# Patient Record
Sex: MALE | Race: WHITE | Employment: FULL TIME | URBAN - METROPOLITAN AREA
[De-identification: names, ages, dates, MRNs, and addresses within clinical notes are randomized per-mention and may not be internally consistent; named-entity substitution may affect disease eponyms.]

---

## 2022-03-14 ENCOUNTER — TELEPHONE (OUTPATIENT)
Dept: SURGERY | Facility: CLINIC | Age: 47
End: 2022-03-14

## 2022-03-14 ENCOUNTER — HOSPITAL ENCOUNTER (OUTPATIENT)
Facility: HOSPITAL | Age: 47
Setting detail: HOSPITAL OUTPATIENT SURGERY
Discharge: HOME | End: 2022-03-14
Attending: COLON & RECTAL SURGERY | Admitting: COLON & RECTAL SURGERY
Payer: COMMERCIAL

## 2022-03-14 ENCOUNTER — ANESTHESIA EVENT (OUTPATIENT)
Dept: SURGERY | Facility: HOSPITAL | Age: 47
End: 2022-03-14
Payer: COMMERCIAL

## 2022-03-14 ENCOUNTER — ANESTHESIA (OUTPATIENT)
Dept: SURGERY | Facility: HOSPITAL | Age: 47
End: 2022-03-14
Payer: COMMERCIAL

## 2022-03-14 ENCOUNTER — OFFICE VISIT (OUTPATIENT)
Dept: SURGERY | Facility: CLINIC | Age: 47
End: 2022-03-14
Payer: COMMERCIAL

## 2022-03-14 VITALS
OXYGEN SATURATION: 100 % | TEMPERATURE: 98 F | HEIGHT: 70 IN | BODY MASS INDEX: 24.08 KG/M2 | HEART RATE: 63 BPM | WEIGHT: 168.19 LBS | DIASTOLIC BLOOD PRESSURE: 82 MMHG | RESPIRATION RATE: 18 BRPM | SYSTOLIC BLOOD PRESSURE: 130 MMHG

## 2022-03-14 VITALS — TEMPERATURE: 97 F | SYSTOLIC BLOOD PRESSURE: 144 MMHG | DIASTOLIC BLOOD PRESSURE: 96 MMHG | HEART RATE: 67 BPM

## 2022-03-14 DIAGNOSIS — K62.89 ANAL PAIN: ICD-10-CM

## 2022-03-14 DIAGNOSIS — K64.8 INTERNAL AND EXTERNAL THROMBOSED HEMORRHOIDS: Primary | ICD-10-CM

## 2022-03-14 DIAGNOSIS — K64.9 HEMORRHOIDS: ICD-10-CM

## 2022-03-14 DIAGNOSIS — K64.5 INTERNAL AND EXTERNAL THROMBOSED HEMORRHOIDS: Primary | ICD-10-CM

## 2022-03-14 LAB — SARS-COV-2 RNA RESP QL NAA+PROBE: NOT DETECTED

## 2022-03-14 PROCEDURE — 3077F SYST BP >= 140 MM HG: CPT | Performed by: COLON & RECTAL SURGERY

## 2022-03-14 PROCEDURE — 06BY0ZC EXCISION OF HEMORRHOIDAL PLEXUS, OPEN APPROACH: ICD-10-PCS | Performed by: COLON & RECTAL SURGERY

## 2022-03-14 PROCEDURE — 99205 OFFICE O/P NEW HI 60 MIN: CPT | Performed by: COLON & RECTAL SURGERY

## 2022-03-14 PROCEDURE — 88304 TISSUE EXAM BY PATHOLOGIST: CPT | Performed by: COLON & RECTAL SURGERY

## 2022-03-14 PROCEDURE — 3080F DIAST BP >= 90 MM HG: CPT | Performed by: COLON & RECTAL SURGERY

## 2022-03-14 RX ORDER — HYDROCODONE BITARTRATE AND ACETAMINOPHEN 10; 325 MG/1; MG/1
1 TABLET ORAL AS NEEDED
Status: COMPLETED | OUTPATIENT
Start: 2022-03-14 | End: 2022-03-14

## 2022-03-14 RX ORDER — BUPIVACAINE HYDROCHLORIDE AND EPINEPHRINE 5; 5 MG/ML; UG/ML
INJECTION, SOLUTION EPIDURAL; INTRACAUDAL; PERINEURAL AS NEEDED
Status: DISCONTINUED | OUTPATIENT
Start: 2022-03-14 | End: 2022-03-14 | Stop reason: HOSPADM

## 2022-03-14 RX ORDER — HEPARIN SODIUM 5000 [USP'U]/ML
5000 INJECTION, SOLUTION INTRAVENOUS; SUBCUTANEOUS ONCE
Status: COMPLETED | OUTPATIENT
Start: 2022-03-14 | End: 2022-03-14

## 2022-03-14 RX ORDER — CLINDAMYCIN PHOSPHATE 900 MG/50ML
INJECTION INTRAVENOUS
Status: DISCONTINUED
Start: 2022-03-14 | End: 2022-03-14

## 2022-03-14 RX ORDER — GLYCOPYRROLATE 0.2 MG/ML
INJECTION, SOLUTION INTRAMUSCULAR; INTRAVENOUS AS NEEDED
Status: DISCONTINUED | OUTPATIENT
Start: 2022-03-14 | End: 2022-03-14 | Stop reason: SURG

## 2022-03-14 RX ORDER — ROCURONIUM BROMIDE 10 MG/ML
INJECTION, SOLUTION INTRAVENOUS AS NEEDED
Status: DISCONTINUED | OUTPATIENT
Start: 2022-03-14 | End: 2022-03-14 | Stop reason: SURG

## 2022-03-14 RX ORDER — MIDAZOLAM HYDROCHLORIDE 1 MG/ML
1 INJECTION INTRAMUSCULAR; INTRAVENOUS EVERY 5 MIN PRN
Status: DISCONTINUED | OUTPATIENT
Start: 2022-03-14 | End: 2022-03-14

## 2022-03-14 RX ORDER — CLINDAMYCIN PHOSPHATE 900 MG/50ML
900 INJECTION INTRAVENOUS ONCE
Status: COMPLETED | OUTPATIENT
Start: 2022-03-14 | End: 2022-03-14

## 2022-03-14 RX ORDER — SODIUM CHLORIDE, SODIUM LACTATE, POTASSIUM CHLORIDE, CALCIUM CHLORIDE 600; 310; 30; 20 MG/100ML; MG/100ML; MG/100ML; MG/100ML
INJECTION, SOLUTION INTRAVENOUS CONTINUOUS
Status: DISCONTINUED | OUTPATIENT
Start: 2022-03-14 | End: 2022-03-14

## 2022-03-14 RX ORDER — NEOSTIGMINE METHYLSULFATE 1 MG/ML
INJECTION INTRAVENOUS AS NEEDED
Status: DISCONTINUED | OUTPATIENT
Start: 2022-03-14 | End: 2022-03-14 | Stop reason: SURG

## 2022-03-14 RX ORDER — NALOXONE HYDROCHLORIDE 0.4 MG/ML
80 INJECTION, SOLUTION INTRAMUSCULAR; INTRAVENOUS; SUBCUTANEOUS AS NEEDED
Status: DISCONTINUED | OUTPATIENT
Start: 2022-03-14 | End: 2022-03-14

## 2022-03-14 RX ORDER — HYDROMORPHONE HYDROCHLORIDE 1 MG/ML
0.4 INJECTION, SOLUTION INTRAMUSCULAR; INTRAVENOUS; SUBCUTANEOUS EVERY 5 MIN PRN
Status: DISCONTINUED | OUTPATIENT
Start: 2022-03-14 | End: 2022-03-14

## 2022-03-14 RX ORDER — DEXAMETHASONE SODIUM PHOSPHATE 4 MG/ML
VIAL (ML) INJECTION AS NEEDED
Status: DISCONTINUED | OUTPATIENT
Start: 2022-03-14 | End: 2022-03-14 | Stop reason: SURG

## 2022-03-14 RX ORDER — ONDANSETRON 2 MG/ML
INJECTION INTRAMUSCULAR; INTRAVENOUS AS NEEDED
Status: DISCONTINUED | OUTPATIENT
Start: 2022-03-14 | End: 2022-03-14 | Stop reason: SURG

## 2022-03-14 RX ORDER — METOCLOPRAMIDE HYDROCHLORIDE 5 MG/ML
10 INJECTION INTRAMUSCULAR; INTRAVENOUS AS NEEDED
Status: DISCONTINUED | OUTPATIENT
Start: 2022-03-14 | End: 2022-03-14

## 2022-03-14 RX ORDER — HYDROCODONE BITARTRATE AND ACETAMINOPHEN 5; 325 MG/1; MG/1
2 TABLET ORAL EVERY 4 HOURS PRN
Qty: 40 TABLET | Refills: 0 | Status: SHIPPED | OUTPATIENT
Start: 2022-03-14

## 2022-03-14 RX ORDER — ACETAMINOPHEN 500 MG
1000 TABLET ORAL ONCE
Status: DISCONTINUED | OUTPATIENT
Start: 2022-03-14 | End: 2022-03-14 | Stop reason: HOSPADM

## 2022-03-14 RX ORDER — HYDROCODONE BITARTRATE AND ACETAMINOPHEN 10; 325 MG/1; MG/1
2 TABLET ORAL AS NEEDED
Status: COMPLETED | OUTPATIENT
Start: 2022-03-14 | End: 2022-03-14

## 2022-03-14 RX ORDER — LIDOCAINE HYDROCHLORIDE 10 MG/ML
INJECTION, SOLUTION EPIDURAL; INFILTRATION; INTRACAUDAL; PERINEURAL AS NEEDED
Status: DISCONTINUED | OUTPATIENT
Start: 2022-03-14 | End: 2022-03-14 | Stop reason: SURG

## 2022-03-14 RX ORDER — ONDANSETRON 2 MG/ML
4 INJECTION INTRAMUSCULAR; INTRAVENOUS AS NEEDED
Status: DISCONTINUED | OUTPATIENT
Start: 2022-03-14 | End: 2022-03-14

## 2022-03-14 RX ORDER — MEPERIDINE HYDROCHLORIDE 25 MG/ML
12.5 INJECTION INTRAMUSCULAR; INTRAVENOUS; SUBCUTANEOUS AS NEEDED
Status: DISCONTINUED | OUTPATIENT
Start: 2022-03-14 | End: 2022-03-14

## 2022-03-14 RX ADMIN — GLYCOPYRROLATE 0.8 MG: 0.2 INJECTION, SOLUTION INTRAMUSCULAR; INTRAVENOUS at 19:47:00

## 2022-03-14 RX ADMIN — ROCURONIUM BROMIDE 40 MG: 10 INJECTION, SOLUTION INTRAVENOUS at 19:05:00

## 2022-03-14 RX ADMIN — LIDOCAINE HYDROCHLORIDE 50 MG: 10 INJECTION, SOLUTION EPIDURAL; INFILTRATION; INTRACAUDAL; PERINEURAL at 19:05:00

## 2022-03-14 RX ADMIN — CLINDAMYCIN PHOSPHATE 900 MG: 900 INJECTION INTRAVENOUS at 19:06:00

## 2022-03-14 RX ADMIN — ONDANSETRON 4 MG: 2 INJECTION INTRAMUSCULAR; INTRAVENOUS at 19:41:00

## 2022-03-14 RX ADMIN — NEOSTIGMINE METHYLSULFATE 4 MG: 1 INJECTION INTRAVENOUS at 19:47:00

## 2022-03-14 RX ADMIN — DEXAMETHASONE SODIUM PHOSPHATE 8 MG: 4 MG/ML VIAL (ML) INJECTION at 19:41:00

## 2022-03-14 NOTE — PATIENT INSTRUCTIONS
This patient presents in acute distress for urgent care of extremely painful hemorrhoids. The hemorrhoid issue started abruptly on Friday, March 11, 2022. They simply popped out. There is no clear etiology, the patient was not doing any heavy lifting, he had no diarrhea or constipation. His pain is 10/10. This patient is 55years old and has not yet had colonoscopy. He will require colonoscopy. We have offered him a procedure later in the year. The patient has significant protrusion, lumps that will not reduce. His current level of pain is 7/10. His pain is quite severe during defecation, but is persistent all day long and all night long for the last several days. He has seen some previous bright red blood per rectum, blood in the toilet, and blood on the toilet paper. Its been going on for approximately a year. He has no history of melena, mucus in the stool, or narrowing of the stool. The patient has no abdominal pain or distention. He has not suffered weight loss as a symptom. He has no family history of colon cancer, colon polyps, or cancer of the uterus. His dad had bladder cancer, his mother had breast cancer. The patient has not had any prior surgery on the anus for either hemorrhoids, abscess, fissure, or fistula. He has never been diagnosed with Crohn disease, ulcerative colitis, or irritable bowel syndrome. He is not on any blood thinners, he has never had heart attack, stroke, heart murmur, heart valve replacement, or cardiac arrhythmia. This patient had an anaphylactic response to aspirin, ibuprofen, and salicylates of all kinds. He is allergic to Keflex with hives. He has never had any issues with penicillin. Clinical exam limited to external exam and digital rectal exam reveals a greater than 5 cm left lateral acutely thrombosed and edematous internal and external hemorrhoid. Both the internal component and external component are thrombosed.   He has no evidence of hemorrhoid tissue on the right, my exam was somewhat limited secondary to patient's pain. There is edema and swelling around this hemorrhoid. This patient is in acute distress and will require urgent surgical intervention. We will be taking this patient to the operating room tonight for excisional hemorrhoidectomy in 1 location. All risks, benefits, complications and alternatives to the proposed procedure(s) were fully discussed with the patient. All questions from the patient were answered in detail. A description of the procedure(s) and possible outcomes was fully discussed. The patient seemed to understand the conversation and its details. Consent for the procedure(s) was confirmed with the patient.

## 2022-03-15 NOTE — ANESTHESIA PROCEDURE NOTES
Airway  Date/Time: 3/14/2022 7:02 PM  Urgency: elective    Airway not difficult    General Information and Staff    Patient location during procedure: OR  Anesthesiologist: Antwan Solomon MD  Performed: anesthesiologist     Indications and Patient Condition  Indications for airway management: anesthesia  Sedation level: deep  Preoxygenated: yes  Patient position: sniffing  Mask difficulty assessment: 1 - vent by mask  Planned trial extubation    Final Airway Details  Final airway type: endotracheal airway      Successful airway: ETT  Cuffed: yes   Successful intubation technique: direct laryngoscopy  Endotracheal tube insertion site: oral  Blade: Enrico  Blade size: #3  ETT size (mm): 7.5    Cormack-Lehane Classification: grade IIA - partial view of glottis  Placement verified by: chest auscultation and capnometry   Measured from: lips  ETT to lips (cm): 21  Number of attempts at approach: 1

## 2022-03-15 NOTE — OPERATIVE REPORT
[facility]  Op Note    Gurjit Lewis Location: OR   CSN [id number] MRN [id number]   Admission Date 3/14/2022 Operation Date 3/14/2022   Attending Physician Ramona Cervantes MD Operating Physician Jelly Don MD     Pre-Operative Diagnosis: Internal and external thrombosed hemorrhoids left lateral  Post-Operative Diagnosis: Internal/external prolapsed thrombosed hemorrhoids left lateral, right anterior  Procedure Performed: Excisional hemorrhoidectomy in 2 location(s)    Surgeon(s) and Role:     Janice Weller MD - Primary    Anesthesia: General    History of Present Illness: This patient is in very acute distress. He has a very large thrombosis event on the left lateral aspect of the anal canal.  He presents with edema, thrombosis, with thrombosis of both the internal and external component of this very large greater than 5 cm hemorrhoid. Operative Findings: This patient was found to have mixed complex prolapsing hemorrhoids  Thrombosed vessels of blood clot were identified. The hemorrhoid(s) were excised without complication  The patient was delivered to recovery room in stable condition        Operative Summary    Following adequate general anesthesia the patient was placed in a prone position on the operating room table. The hips were flexed in the jackknife position, the buttocks were taped apart. The perineum was painted with Betadine paint. Sterile drapes were placed with wide exposure of the perineum. Anoscopy was performed to reveal the above listed findings. Each hemorrhoid was treated in an identical fashion. This patient had 2 large internal and external prolapsed hemorrhoid(s). 0.5% Marcaine with epinephrine was injected into all hemorrhoidal complexes, the perianal skin, and into the deep sphincter complex. A 2-0 Vicryl suture was placed at the apex of the hemorrhoidal complex. A V-shaped incision was made in the perianal skin.  The hemorrhoidal tissues and hemorrhoidal complexes were dissected off of the sphincter complex was sharp scissors dissection. Great care was taken to avoid the sphincter mechanism. The resultant incision was closed with a running 2-0 Vicryl suture line in a simple continuous fashion. Great care was taken to reapproximate the anoderm to cutaneous junction and the anoderm mucosal junction. Reinforcing sutures of 2-0 Vicryl were used where necessary to provide further hemostasis and to strengthen the suture line. Once all hemorrhoids were treated in this fashion, all sponge counts and instrument counts were found to be correct, and the patient was returned to the supine position and taken to recovery room in stable postoperative condition.           Pathologic Specimen: hemorrhoid tissue    Drains: none        EBL: 5 cc               Alin Saenz MD  3/14/2022  7:49 PM

## 2022-03-18 RX ORDER — HYDROCODONE BITARTRATE AND ACETAMINOPHEN 5; 325 MG/1; MG/1
1 TABLET ORAL EVERY 6 HOURS PRN
Qty: 10 TABLET | Refills: 0 | Status: SHIPPED | OUTPATIENT
Start: 2022-03-18

## 2022-04-21 ENCOUNTER — OFFICE VISIT (OUTPATIENT)
Dept: SURGERY | Facility: CLINIC | Age: 47
End: 2022-04-21

## 2022-04-21 VITALS
TEMPERATURE: 98 F | HEIGHT: 70 IN | DIASTOLIC BLOOD PRESSURE: 78 MMHG | BODY MASS INDEX: 24.05 KG/M2 | HEART RATE: 81 BPM | WEIGHT: 168 LBS | SYSTOLIC BLOOD PRESSURE: 117 MMHG

## 2022-04-21 DIAGNOSIS — K64.5 INTERNAL AND EXTERNAL THROMBOSED HEMORRHOIDS: ICD-10-CM

## 2022-04-21 DIAGNOSIS — K64.8 INTERNAL AND EXTERNAL THROMBOSED HEMORRHOIDS: ICD-10-CM

## 2022-04-21 DIAGNOSIS — Z12.11 COLON CANCER SCREENING: Primary | ICD-10-CM

## 2022-04-21 PROCEDURE — 3078F DIAST BP <80 MM HG: CPT | Performed by: COLON & RECTAL SURGERY

## 2022-04-21 PROCEDURE — 3008F BODY MASS INDEX DOCD: CPT | Performed by: COLON & RECTAL SURGERY

## 2022-04-21 PROCEDURE — 99024 POSTOP FOLLOW-UP VISIT: CPT | Performed by: COLON & RECTAL SURGERY

## 2022-04-21 PROCEDURE — 3074F SYST BP LT 130 MM HG: CPT | Performed by: COLON & RECTAL SURGERY

## 2022-04-21 RX ORDER — POLYETHYLENE GLYCOL 3350, SODIUM CHLORIDE, SODIUM BICARBONATE, POTASSIUM CHLORIDE 420; 11.2; 5.72; 1.48 G/4L; G/4L; G/4L; G/4L
POWDER, FOR SOLUTION ORAL
Qty: 1 EACH | Refills: 0 | Status: SHIPPED | OUTPATIENT
Start: 2022-04-21

## 2022-04-22 NOTE — PATIENT INSTRUCTIONS
This patient is doing extremely well after excisional hemorrhoidectomy performed on March 14, 2022. The patient has no nausea, vomiting, diarrhea, abdominal pain, or constipation. The patient has no complaints at the operative site. The patient has no incisional complaints. The patient denies fever or chills. Anal exam including digital rectal exam reveals him to have healed the excisional hemorrhoidectomy sites in the right lateral and left anterolateral positions. Incisions are clean, dry, intact, without erythema or cellulitis. He is approximately 95% healed from the operation. There are no recurrent or residual external hemorrhoids. No palpable internal hemorrhoids. Counseled patient on postoperative care. I have no further follow-up scheduled with this patient at this time. This patient can see me on an as-needed basis. This patient should return urgently for any problems or complications related to my surgical intervention. I do have him scheduled for colonoscopy as a screening examination on June 7, 2022. All risks, benefits, complications and alternatives to the proposed procedure(s) were fully discussed with the patient. All questions from the patient were answered in detail. A description of the procedure(s) and possible outcomes was fully discussed. The patient seemed to understand the conversation and its details. Consent for the procedure(s) was confirmed with the patient.

## 2022-05-20 ENCOUNTER — TELEPHONE (OUTPATIENT)
Dept: SURGERY | Facility: CLINIC | Age: 47
End: 2022-05-20

## 2022-06-06 DIAGNOSIS — Z12.11 SPECIAL SCREENING FOR MALIGNANT NEOPLASMS, COLON: Primary | ICD-10-CM

## 2022-08-25 ENCOUNTER — ANESTHESIA EVENT (OUTPATIENT)
Dept: ENDOSCOPY | Facility: HOSPITAL | Age: 47
End: 2022-08-25
Payer: COMMERCIAL

## 2022-08-25 ENCOUNTER — ANESTHESIA (OUTPATIENT)
Dept: ENDOSCOPY | Facility: HOSPITAL | Age: 47
End: 2022-08-25
Payer: COMMERCIAL

## 2022-08-25 ENCOUNTER — HOSPITAL ENCOUNTER (OUTPATIENT)
Facility: HOSPITAL | Age: 47
Setting detail: HOSPITAL OUTPATIENT SURGERY
Discharge: HOME | End: 2022-08-25
Attending: COLON & RECTAL SURGERY | Admitting: COLON & RECTAL SURGERY
Payer: COMMERCIAL

## 2022-08-25 VITALS
HEART RATE: 64 BPM | RESPIRATION RATE: 18 BRPM | SYSTOLIC BLOOD PRESSURE: 116 MMHG | OXYGEN SATURATION: 100 % | TEMPERATURE: 98 F | DIASTOLIC BLOOD PRESSURE: 72 MMHG | HEIGHT: 70 IN | BODY MASS INDEX: 24.34 KG/M2 | WEIGHT: 170 LBS

## 2022-08-25 DIAGNOSIS — Z12.11 COLON CANCER SCREENING: ICD-10-CM

## 2022-08-25 PROBLEM — K57.90 DIVERTICULOSIS: Status: ACTIVE | Noted: 2022-08-25

## 2022-08-25 PROCEDURE — 0DJD8ZZ INSPECTION OF LOWER INTESTINAL TRACT, VIA NATURAL OR ARTIFICIAL OPENING ENDOSCOPIC: ICD-10-PCS | Performed by: COLON & RECTAL SURGERY

## 2022-08-25 RX ORDER — SODIUM CHLORIDE, SODIUM LACTATE, POTASSIUM CHLORIDE, CALCIUM CHLORIDE 600; 310; 30; 20 MG/100ML; MG/100ML; MG/100ML; MG/100ML
INJECTION, SOLUTION INTRAVENOUS CONTINUOUS
Status: DISCONTINUED | OUTPATIENT
Start: 2022-08-25 | End: 2022-08-25

## 2022-08-25 RX ORDER — NALOXONE HYDROCHLORIDE 0.4 MG/ML
80 INJECTION, SOLUTION INTRAMUSCULAR; INTRAVENOUS; SUBCUTANEOUS AS NEEDED
Status: DISCONTINUED | OUTPATIENT
Start: 2022-08-25 | End: 2022-08-25

## 2022-08-25 RX ORDER — LIDOCAINE HYDROCHLORIDE 10 MG/ML
INJECTION, SOLUTION EPIDURAL; INFILTRATION; INTRACAUDAL; PERINEURAL AS NEEDED
Status: DISCONTINUED | OUTPATIENT
Start: 2022-08-25 | End: 2022-08-25 | Stop reason: SURG

## 2022-08-25 RX ADMIN — SODIUM CHLORIDE, SODIUM LACTATE, POTASSIUM CHLORIDE, CALCIUM CHLORIDE: 600; 310; 30; 20 INJECTION, SOLUTION INTRAVENOUS at 08:37:00

## 2022-08-25 RX ADMIN — SODIUM CHLORIDE, SODIUM LACTATE, POTASSIUM CHLORIDE, CALCIUM CHLORIDE: 600; 310; 30; 20 INJECTION, SOLUTION INTRAVENOUS at 08:29:00

## 2022-08-25 RX ADMIN — LIDOCAINE HYDROCHLORIDE 50 MG: 10 INJECTION, SOLUTION EPIDURAL; INFILTRATION; INTRACAUDAL; PERINEURAL at 08:32:00

## 2022-08-25 NOTE — ANESTHESIA POSTPROCEDURE EVALUATION
[facility] Patient Status:  Hospital Outpatient Surgery   Age/Gender 52year old male MRN [id number]   Location [facility] 28 Attending Teo Blanco MD   [address] [facility] [address] Day # 0 PCP Jw Garcia MD       Anesthesia Post-op Note    COLONOSCOPY    Procedure Summary     Date: 08/25/22 Room / Location: [facility] [facility] ENDOSCOPY 04 / [facility] ENDOSCOPY    Anesthesia Start: Janifer Kraft Anesthesia Stop: 2326    Procedure: COLONOSCOPY (N/A ) Diagnosis:       Colon cancer screening      (Diverticulosis)    Surgeons: Teo Blanco MD Anesthesiologist: Howard Durham MD    Anesthesia Type: MAC ASA Status: 2          Anesthesia Type: MAC    Vitals Value Taken Time   /67 08/25/22 0854   Temp  08/25/22 0854   Pulse 61 08/25/22 0853   Resp 16 08/25/22 0854   SpO2 97 % 08/25/22 0853   Vitals shown include unvalidated device data. Patient Location: Endoscopy    Anesthesia Type: MAC    Airway Patency: patent    Postop Pain Control: adequate    Mental Status: mildly sedated but able to meaningfully participate in the post-anesthesia evaluation    Nausea/Vomiting: none    Cardiopulmonary/Hydration status: stable euvolemic    Complications: no apparent anesthesia related complications    Postop vital signs: stable    Dental Exam: Unchanged from Preop    Patient to be discharged home when criteria met.

## 2022-08-25 NOTE — OPERATIVE REPORT
[facility]  Op Note    Candice Terrell Location: OR   [facility] [id number] MRN [id number]   Admission Date 8/25/2022 Operation Date 8/25/2022   Attending Physician Freida Nolan MD Operating Physician Jeannene Lesch, MD     Pre-Operative Diagnosis: Colon cancer screening [Z12.11]    Post-Operative Diagnosis: Minimal diverticulosis    Procedure Performed: Colonoscopy    Surgeon: Jeannene Lesch, MD    Anesthesia: [facility]      History of present illness: This patient presents for screening colonoscopy at age 52. Operative findings:  Minimal diverticulosis of the sigmoid colon    Operative summary:  Following adequate IV sedation, the patient was placed in the left lateral decubitus position on the operating room table. Digital rectal examination was performed. The colonoscope was inserted, and passed to the end of the colon. Upon withdrawal of the scope, the prep was rated as follows and the [facility] bowel prep scale:  3    0. Unprepared colon segment with stool but cannot be cleared  1. Portion of mucosa in segments seen after cleaning, but other areas not seen because of retained material  2. Minor residual material after cleaning, but mucosa of segment generally well seen  3. Entire mucosa of segment well seen after cleaning        Digital rectal examination was performed, the rectal exam had the following findings:   Scarring and deformity from recent hemorrhoidectomy, prostate normal    Landmarks were identified confirming the location of the colonoscope in the cecum, including the transverse cecal fold, and the ileocecal valve. Upon withdrawal of the scope, the patient had the following findings:  The patient does not have polyps. The patient does have diverticulosis. The patient does not have inflammation. This patient has not had a previous colon resection.      The scope was retroverted in the anal canal, the anal canal had the following findings:    The patient does not have internal hemorrhoids. The patient does not have anal canal polyps. The patient does have hypertrophied anal papillae. The procedures performed during the colonoscopy are listed as follows:  Minimal diverticulosis    The scope was withdrawn, the patient was taken to the recovery room in stable postoperative condition. Pathologic specimens:  None    Complications: None      Addendum: This patient can be returned to the general screening population. Current recommendations for screening include colonoscopy every 10 years starting at age 39. If this patient displays any signs and symptoms consistent with colon cancer, the patient should return to me immediately, even if it is prior to 10 years, or age 39.         Silvia Uribe MD  8/25/2022  8:46 AM

## 2022-09-30 ENCOUNTER — PATIENT OUTREACH (OUTPATIENT)
Facility: LOCATION | Age: 47
End: 2022-09-30

## 2022-12-26 ENCOUNTER — TELEMEDICINE (OUTPATIENT)
Dept: TELEHEALTH | Age: 47
End: 2022-12-26

## 2022-12-26 DIAGNOSIS — J01.00 ACUTE NON-RECURRENT MAXILLARY SINUSITIS: Primary | ICD-10-CM

## 2022-12-26 RX ORDER — DOXYCYCLINE HYCLATE 100 MG/1
100 CAPSULE ORAL 2 TIMES DAILY
Qty: 14 CAPSULE | Refills: 0 | Status: SHIPPED | OUTPATIENT
Start: 2022-12-26 | End: 2023-01-02

## (undated) DIAGNOSIS — G89.18 POST-OPERATIVE PAIN: Primary | ICD-10-CM

## (undated) DEVICE — ENDOSCOPY PACK - LOWER: Brand: MEDLINE INDUSTRIES, INC.

## (undated) DEVICE — FILTERLINE NASAL ADULT O2/CO2

## (undated) DEVICE — RECTAL CDS-LF: Brand: MEDLINE INDUSTRIES, INC.

## (undated) DEVICE — 3M™ RED DOT™ MONITORING ELECTRODE WITH FOAM TAPE AND STICKY GEL, 50/BAG, 20/CASE, 72/PLT 2570: Brand: RED DOT™

## (undated) DEVICE — GAUZE SPONGES,USP TYPE VII GAUZE, 12 PLY: Brand: CURITY

## (undated) DEVICE — Device: Brand: DEFENDO AIR/WATER/SUCTION AND BIOPSY VALVE

## (undated) DEVICE — 1200CC GUARDIAN II: Brand: GUARDIAN

## (undated) DEVICE — PANTS KNIT WASHABLE 2/3XL

## (undated) DEVICE — STERILE POLYISOPRENE POWDER-FREE SURGICAL GLOVES: Brand: PROTEXIS

## (undated) DEVICE — SPONGE RAYTEC 4X4 RF DETECT

## (undated) DEVICE — SOL  .9 1000ML BTL

## (undated) DEVICE — SCD SLEEVE KNEE HI BLEND

## (undated) DEVICE — VIOLET BRAIDED (POLYGLACTIN 910), SYNTHETIC ABSORBABLE SUTURE: Brand: COATED VICRYL

## (undated) NOTE — LETTER
22    Patient: Amarjit Benedict  : 1975 Visit date: 3/14/2022    Dear  Saad Ramirez MD    Thank you for referring Amarjit Benedict to my practice. Please find my assessment and plan below. Assessment   Internal and external thrombosed hemorrhoids  (primary encounter diagnosis)  Anal pain      Plan   This patient presents in acute distress for urgent care of extremely painful hemorrhoids. The hemorrhoid issue started abruptly on 2022. They simply popped out. There is no clear etiology, the patient was not doing any heavy lifting, he had no diarrhea or constipation. His pain is 10/10. This patient is 55years old and has not yet had colonoscopy. He will require colonoscopy. We have offered him a procedure later in the year. The patient has significant protrusion, lumps that will not reduce. His current level of pain is 7/10. His pain is quite severe during defecation, but is persistent all day long and all night long for the last several days. He has seen some previous bright red blood per rectum, blood in the toilet, and blood on the toilet paper. Its been going on for approximately a year. He has no history of melena, mucus in the stool, or narrowing of the stool. The patient has no abdominal pain or distention. He has not suffered weight loss as a symptom. He has no family history of colon cancer, colon polyps, or cancer of the uterus. His dad had bladder cancer, his mother had breast cancer. The patient has not had any prior surgery on the anus for either hemorrhoids, abscess, fissure, or fistula. He has never been diagnosed with Crohn disease, ulcerative colitis, or irritable bowel syndrome. He is not on any blood thinners, he has never had heart attack, stroke, heart murmur, heart valve replacement, or cardiac arrhythmia. This patient had an anaphylactic response to aspirin, ibuprofen, and salicylates of all kinds.     He is allergic to Keflex with hives. He has never had any issues with penicillin. Clinical exam limited to external exam and digital rectal exam reveals a greater than 5 cm left lateral acutely thrombosed and edematous internal and external hemorrhoid. Both the internal component and external component are thrombosed. He has no evidence of hemorrhoid tissue on the right, my exam was somewhat limited secondary to patient's pain. There is edema and swelling around this hemorrhoid. This patient is in acute distress and will require urgent surgical intervention. We will be taking this patient to the operating room [facility] for excisional hemorrhoidectomy in 1 location.         Sincerely,       Capri Vaca MD   CC: No Recipients

## (undated) NOTE — LETTER
22    Patient: Amy Dubon  : 1975 Visit date: 2022    Dear  Aron Lerner MD    Thank you for referring Amy Dubon to my practice. Please find my assessment and plan below. Assessment   Colon cancer screening  (primary encounter diagnosis)  Internal and external thrombosed hemorrhoids      Plan   This patient is doing extremely well after excisional hemorrhoidectomy performed on 2022. The patient has no nausea, vomiting, diarrhea, abdominal pain, or constipation. The patient has no complaints at the operative site. The patient has no incisional complaints. The patient denies fever or chills. Anal exam including digital rectal exam reveals him to have healed the excisional hemorrhoidectomy sites in the right lateral and left anterolateral positions. Incisions are clean, dry, intact, without erythema or cellulitis. He is approximately 95% healed from the operation. There are no recurrent or residual external hemorrhoids. No palpable internal hemorrhoids. Counseled patient on postoperative care. I have no further follow-up scheduled with this patient at this time. This patient can see me on an as-needed basis. This patient should return urgently for any problems or complications related to my surgical intervention. I do have him scheduled for colonoscopy as a screening examination on 2022. All risks, benefits, complications and alternatives to the proposed procedure(s) were fully discussed with the patient. All questions from the patient were answered in detail. A description of the procedure(s) and possible outcomes was fully discussed. The patient seemed to understand the conversation and its details. Consent for the procedure(s) was confirmed with the patient.                Sincerely,       Jeremi Erazo MD   CC: No Recipients